# Patient Record
Sex: FEMALE | Race: WHITE | Employment: UNEMPLOYED
[De-identification: names, ages, dates, MRNs, and addresses within clinical notes are randomized per-mention and may not be internally consistent; named-entity substitution may affect disease eponyms.]

---

## 2017-07-08 ENCOUNTER — HEALTH MAINTENANCE LETTER (OUTPATIENT)
Age: 52
End: 2017-07-08

## 2018-07-15 ENCOUNTER — HEALTH MAINTENANCE LETTER (OUTPATIENT)
Age: 53
End: 2018-07-15

## 2019-10-05 ENCOUNTER — HEALTH MAINTENANCE LETTER (OUTPATIENT)
Age: 54
End: 2019-10-05

## 2019-12-23 ENCOUNTER — HOSPITAL ENCOUNTER (EMERGENCY)
Facility: CLINIC | Age: 54
Discharge: PSYCHIATRIC OR SUBSTANCE USE FACILITY | End: 2019-12-24
Attending: EMERGENCY MEDICINE | Admitting: EMERGENCY MEDICINE
Payer: COMMERCIAL

## 2019-12-23 DIAGNOSIS — R45.89 AT RISK FOR SUICIDE: ICD-10-CM

## 2019-12-23 PROCEDURE — 90791 PSYCH DIAGNOSTIC EVALUATION: CPT

## 2019-12-23 PROCEDURE — 99285 EMERGENCY DEPT VISIT HI MDM: CPT | Mod: 25

## 2019-12-23 PROCEDURE — 25000132 ZZH RX MED GY IP 250 OP 250 PS 637: Mod: GY | Performed by: EMERGENCY MEDICINE

## 2019-12-23 RX ORDER — ACETAMINOPHEN 500 MG
1000 TABLET ORAL ONCE
Status: COMPLETED | OUTPATIENT
Start: 2019-12-23 | End: 2019-12-23

## 2019-12-23 RX ORDER — ACETAMINOPHEN 500 MG
500 TABLET ORAL ONCE
Status: COMPLETED | OUTPATIENT
Start: 2019-12-23 | End: 2019-12-24

## 2019-12-23 RX ORDER — OLANZAPINE 10 MG/1
10 TABLET, ORALLY DISINTEGRATING ORAL
Status: DISCONTINUED | OUTPATIENT
Start: 2019-12-23 | End: 2019-12-24 | Stop reason: HOSPADM

## 2019-12-23 RX ADMIN — ACETAMINOPHEN 500 MG: 500 TABLET, FILM COATED ORAL at 22:55

## 2019-12-23 ASSESSMENT — MIFFLIN-ST. JEOR: SCORE: 2620.72

## 2019-12-23 NOTE — ED TRIAGE NOTES
"\"I'm having suicidal thoughts and I am not able to care for myself.\" Pt came from [facility]. Pt has been released.   "

## 2019-12-24 ENCOUNTER — HOSPITAL ENCOUNTER (INPATIENT)
Facility: CLINIC | Age: 54
LOS: 4 days | Discharge: HOME | DRG: 885 | End: 2019-12-28
Attending: PSYCHIATRY & NEUROLOGY | Admitting: PSYCHIATRY & NEUROLOGY
Payer: COMMERCIAL

## 2019-12-24 VITALS
HEART RATE: 105 BPM | SYSTOLIC BLOOD PRESSURE: 166 MMHG | TEMPERATURE: 97.5 F | DIASTOLIC BLOOD PRESSURE: 90 MMHG | HEIGHT: 70 IN | WEIGHT: 293 LBS | OXYGEN SATURATION: 96 % | BODY MASS INDEX: 41.95 KG/M2 | RESPIRATION RATE: 16 BRPM

## 2019-12-24 PROBLEM — R45.851 SUICIDAL IDEATIONS: Status: ACTIVE | Noted: 2019-12-24

## 2019-12-24 PROCEDURE — 25000132 ZZH RX MED GY IP 250 OP 250 PS 637: Mod: GY | Performed by: EMERGENCY MEDICINE

## 2019-12-24 PROCEDURE — 25000132 ZZH RX MED GY IP 250 OP 250 PS 637: Performed by: CLINICAL NURSE SPECIALIST

## 2019-12-24 PROCEDURE — 12400001 ZZH R&B MH UMMC

## 2019-12-24 RX ORDER — HYDROXYZINE HYDROCHLORIDE 25 MG/1
25 TABLET, FILM COATED ORAL EVERY 4 HOURS PRN
Status: DISCONTINUED | OUTPATIENT
Start: 2019-12-24 | End: 2019-12-28 | Stop reason: HOSPADM

## 2019-12-24 RX ORDER — LEVOTHYROXINE SODIUM 125 UG/1
125 CAPSULE ORAL
COMMUNITY

## 2019-12-24 RX ORDER — RIVAROXABAN 20 MG/1
TABLET, FILM COATED ORAL
COMMUNITY
Start: 2019-12-11 | End: 2019-12-24

## 2019-12-24 RX ORDER — PREGABALIN 100 MG/1
100 CAPSULE ORAL 2 TIMES DAILY
COMMUNITY

## 2019-12-24 RX ORDER — NALOXONE HYDROCHLORIDE 0.4 MG/ML
.1-.4 INJECTION, SOLUTION INTRAMUSCULAR; INTRAVENOUS; SUBCUTANEOUS
Status: DISCONTINUED | OUTPATIENT
Start: 2019-12-24 | End: 2019-12-28 | Stop reason: HOSPADM

## 2019-12-24 RX ORDER — TOPIRAMATE SPINKLE 25 MG/1
50 CAPSULE ORAL AT BEDTIME
Status: DISCONTINUED | OUTPATIENT
Start: 2019-12-24 | End: 2019-12-25

## 2019-12-24 RX ORDER — ALBUTEROL SULFATE 90 UG/1
1-2 AEROSOL, METERED RESPIRATORY (INHALATION) EVERY 6 HOURS PRN
Status: DISCONTINUED | OUTPATIENT
Start: 2019-12-24 | End: 2019-12-28 | Stop reason: HOSPADM

## 2019-12-24 RX ORDER — ALBUTEROL SULFATE 90 UG/1
1-2 AEROSOL, METERED RESPIRATORY (INHALATION) EVERY 6 HOURS PRN
COMMUNITY

## 2019-12-24 RX ORDER — MONTELUKAST SODIUM 10 MG/1
10 TABLET ORAL AT BEDTIME
COMMUNITY

## 2019-12-24 RX ORDER — LEVOTHYROXINE SODIUM 150 UG/1
150 TABLET ORAL
COMMUNITY
End: 2019-12-24

## 2019-12-24 RX ORDER — MOMETASONE FUROATE MONOHYDRATE 50 UG/1
2 SPRAY, METERED NASAL DAILY
COMMUNITY

## 2019-12-24 RX ORDER — MONTELUKAST SODIUM 10 MG/1
10 TABLET ORAL AT BEDTIME
Status: DISCONTINUED | OUTPATIENT
Start: 2019-12-24 | End: 2019-12-28 | Stop reason: HOSPADM

## 2019-12-24 RX ORDER — KETOCONAZOLE 20 MG/ML
SHAMPOO TOPICAL DAILY
Status: DISCONTINUED | OUTPATIENT
Start: 2019-12-24 | End: 2019-12-28 | Stop reason: HOSPADM

## 2019-12-24 RX ORDER — BUPROPION HYDROCHLORIDE 100 MG/1
350 TABLET ORAL EVERY MORNING
COMMUNITY

## 2019-12-24 RX ORDER — MULTIPLE VITAMINS W/ MINERALS TAB 9MG-400MCG
1 TAB ORAL 2 TIMES DAILY
Status: DISCONTINUED | OUTPATIENT
Start: 2019-12-24 | End: 2019-12-28 | Stop reason: HOSPADM

## 2019-12-24 RX ORDER — TOPIRAMATE SPINKLE 25 MG/1
CAPSULE ORAL
COMMUNITY
Start: 2019-12-16 | End: 2019-12-24

## 2019-12-24 RX ORDER — LEVOTHYROXINE SODIUM 13 UG/1
150 CAPSULE ORAL
COMMUNITY

## 2019-12-24 RX ORDER — LEVOTHYROXINE SODIUM 13 UG/1
150 CAPSULE ORAL
Status: DISCONTINUED | OUTPATIENT
Start: 2019-12-25 | End: 2019-12-28 | Stop reason: HOSPADM

## 2019-12-24 RX ORDER — PENICILLIN V POTASSIUM 500 MG/1
500 TABLET, FILM COATED ORAL 2 TIMES DAILY
COMMUNITY

## 2019-12-24 RX ORDER — PENICILLIN V POTASSIUM 500 MG/1
500 TABLET, FILM COATED ORAL 2 TIMES DAILY
Status: DISCONTINUED | OUTPATIENT
Start: 2019-12-24 | End: 2019-12-28 | Stop reason: HOSPADM

## 2019-12-24 RX ORDER — HYDROCODONE BITARTRATE AND ACETAMINOPHEN 5; 325 MG/1; MG/1
1-2 TABLET ORAL EVERY 4 HOURS PRN
Status: DISCONTINUED | OUTPATIENT
Start: 2019-12-24 | End: 2019-12-28 | Stop reason: HOSPADM

## 2019-12-24 RX ORDER — LEVOTHYROXINE SODIUM 125 UG/1
125 TABLET ORAL
COMMUNITY
End: 2019-12-24

## 2019-12-24 RX ORDER — HYDROCODONE BITARTRATE AND ACETAMINOPHEN 5; 325 MG/1; MG/1
2 TABLET ORAL ONCE
Status: COMPLETED | OUTPATIENT
Start: 2019-12-24 | End: 2019-12-24

## 2019-12-24 RX ORDER — PREGABALIN 75 MG/1
CAPSULE ORAL
COMMUNITY
Start: 2019-12-16 | End: 2019-12-24

## 2019-12-24 RX ORDER — MULTIPLE VITAMINS W/ MINERALS TAB 9MG-400MCG
1 TAB ORAL 2 TIMES DAILY
COMMUNITY

## 2019-12-24 RX ORDER — PREGABALIN 50 MG/1
100 CAPSULE ORAL 2 TIMES DAILY
Status: DISCONTINUED | OUTPATIENT
Start: 2019-12-24 | End: 2019-12-25

## 2019-12-24 RX ORDER — LEVOTHYROXINE SODIUM 125 UG/1
125 CAPSULE ORAL
Status: DISCONTINUED | OUTPATIENT
Start: 2019-12-25 | End: 2019-12-28 | Stop reason: HOSPADM

## 2019-12-24 RX ORDER — PREGABALIN 100 MG/1
CAPSULE ORAL
COMMUNITY
Start: 2019-12-20 | End: 2019-12-24

## 2019-12-24 RX ORDER — MOMETASONE FUROATE MONOHYDRATE 50 UG/1
2 SPRAY, METERED NASAL DAILY
Status: DISCONTINUED | OUTPATIENT
Start: 2019-12-24 | End: 2019-12-28 | Stop reason: HOSPADM

## 2019-12-24 RX ORDER — TOPIRAMATE SPINKLE 25 MG/1
50 CAPSULE ORAL AT BEDTIME
COMMUNITY

## 2019-12-24 RX ORDER — PREGABALIN 75 MG/1
75 CAPSULE ORAL 2 TIMES DAILY
COMMUNITY

## 2019-12-24 RX ADMIN — PENICILLIN V POTASIUM 500 MG: 500 TABLET OROPHARYNGEAL at 20:14

## 2019-12-24 RX ADMIN — ACETAMINOPHEN 500 MG: 500 TABLET, FILM COATED ORAL at 00:15

## 2019-12-24 RX ADMIN — MONTELUKAST 10 MG: 10 TABLET, FILM COATED ORAL at 20:14

## 2019-12-24 RX ADMIN — HYDROCODONE BITARTRATE AND ACETAMINOPHEN 2 TABLET: 5; 325 TABLET ORAL at 08:19

## 2019-12-24 RX ADMIN — PREGABALIN 100 MG: 50 CAPSULE ORAL at 20:13

## 2019-12-24 RX ADMIN — MOMETASONE 2 SPRAY: 50 SPRAY, METERED NASAL at 20:15

## 2019-12-24 RX ADMIN — MULTIPLE VITAMINS W/ MINERALS TAB 1 TABLET: TAB at 20:18

## 2019-12-24 RX ADMIN — TOPIRAMATE 50 MG: 25 CAPSULE, COATED PELLETS ORAL at 20:15

## 2019-12-24 RX ADMIN — RIVAROXABAN 20 MG: 10 TABLET, FILM COATED ORAL at 20:14

## 2019-12-24 ASSESSMENT — ACTIVITIES OF DAILY LIVING (ADL)
HYGIENE/GROOMING: WITH ASSISTANCE
DRESS: WITH ASSISTANCE
LAUNDRY: UNABLE TO COMPLETE
ORAL_HYGIENE: INDEPENDENT

## 2019-12-24 NOTE — PHARMACY-ADMISSION MEDICATION HISTORY
Pharmacy Medication History  Admission medication history interview status for the 12/23/2019  admission is complete. See EPIC admission navigator for prior to admission medications     Medication history sources: Spoke w/ patient.  Called [facility] pharmacy in [place].    Medication history source reliability: Moderate  Adherence assessment: Moderate - patient has not taken her medications since Sunday.    Medication reconciliation completed by provider prior to medication history? No    Time spent in this activity: 30 minutes      Prior to Admission medications    Medication Sig Last Dose Taking? Auth Provider   albuterol (PROAIR HFA/PROVENTIL HFA/VENTOLIN HFA) 108 (90 Base) MCG/ACT inhaler Inhale 1-2 puffs into the lungs every 6 hours as needed for shortness of breath / dyspnea or wheezing  at prn Yes Unknown, Entered By History   buPROPion (WELLBUTRIN) 100 MG tablet Take 350 mg by mouth every morning  Yes Unknown, Entered By History   diclofenac (VOLTAREN) 1 % topical gel Place 4 g onto the skin 2 times daily as needed (Knee pain)   at prn Yes Unknown, Entered By History   fluticasone-salmeterol (ADVAIR) 500-50 MCG/DOSE inhaler Inhale 1 puff into the lungs every 12 hours  Yes Unknown, Entered By History   HYDROcodone-acetaminophen (NORCO) 5-325 MG per tablet Take 1-2 tablets by mouth every 4 hours as needed for other (Moderate to Severe Pain)  at prn Yes Carly Schwartz PA-C   ketoconazole (NIZORAL) 2 % shampoo Apply  topically. topically once daily prn  at prn Yes Mago Acosta MD   levothyroxine (TIROSINT) 125 MCG capsule Take 125 mcg by mouth every morning (before breakfast) Takes with 150 mcg capsule  Total dose = 275 mcg daily  Yes Unknown, Entered By History   levothyroxine (TIROSINT) 150 MCG capsule Take 150 mcg by mouth every morning (before breakfast) Takes with 125 mcg capsules  Total dose = 275 mcg daily  Yes Unknown, Entered By History   mometasone (NASONEX) 50 MCG/ACT nasal spray  Spray 2 sprays into both nostrils daily  Yes Unknown, Entered By History   montelukast (SINGULAIR) 10 MG tablet Take 10 mg by mouth At Bedtime  Yes Unknown, Entered By History   multivitamin w/minerals (THERA-VIT-M) tablet Take 1 tablet by mouth 2 times daily  Yes Unknown, Entered By History   penicillin V (VEETID) 500 MG tablet Take 500 mg by mouth 2 times daily  Yes Unknown, Entered By History   pregabalin (LYRICA) 100 MG capsule Take 100 mg by mouth 2 times daily Takes with 75 mg capsules  Yes Unknown, Entered By History   pregabalin (LYRICA) 75 MG capsule Take 75 mg by mouth 2 times daily Takes with 100 mg capsules  Yes Unknown, Entered By History   rivaroxaban ANTICOAGULANT (XARELTO) 20 MG TABS tablet Take 20 mg by mouth daily 12/22/2019 at am Yes Unknown, Entered By History   tiZANidine (ZANAFLEX) 4 MG tablet Take 4 mg by mouth 3 times daily as needed for muscle spasms  Yes Unknown, Entered By History   topiramate (TOPAMAX) 25 MG capsule Take 50 mg by mouth At Bedtime   Yes Unknown, Entered By History   ACE/ARB NOT PRESCRIBED, INTENTIONAL, by Other route continuous prn.   Tamie Thompson MD   ASPIRIN NOT PRESCRIBED, INTENTIONAL, by Other route continuous prn.   Tamie Thompson MD Carolyn Dahlman, PharmD, BCPS

## 2019-12-24 NOTE — ED NOTES
Pt hit call light and is requesting food; pt was informed that we do not have meals in the dept now; will bring patient once we get them. Pt is again requesting meds; pt ED care team was made aware. Pt also requested her beef jerky from her bag; pt was assisted in finding it. All other belongings were returned to the pt's locker in ED room 16.

## 2019-12-24 NOTE — ED NOTES
Pt was wanded and patted down by ED staff due to hospital not having large enough sized scrubs to fit pt
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
## 2019-12-24 NOTE — ED NOTES
Pt. Ambulated to door and back to cart in room. C/O knees and legs hurting and in tears. States also has crutches and walker to use for ambulation.

## 2019-12-24 NOTE — ED NOTES
Assumed care of patient: patient resting on bed using her own CPAP machine- breaths even and unlabored, equal chest rise and fall

## 2019-12-24 NOTE — ED NOTES
patient c/o 10/10 back pain. States that the muscle relaxer usually works but its not working now. Patient crying, moaning in pain. Up to bedside commode independently. Pleasant and cooperative. Breakfast ordered. Pt repositions self in bed. Awaiting bed placement

## 2019-12-24 NOTE — ED NOTES
Pt presents with increased depression and suicidal thoughts. Pt states she has hx of suicide attempt in the 80s and once in 1998. Pt states she has been having thoughts of overdosing on her muscle relaxants but does not want to because she does not want her MD's name to be on the pills she takes. Pt was in [facility] last night due to an argument with her wife and was released today and came here. Pt states she has been having really bad depression since the fall but has avoided hospitals because she is unsure if inpatient psych will be able to accommodate her health and physical needs. Pt is 430 pounds and states she needs help with ambulating and sleeps in a recliner and uses a cane/cpap. Pt cooperative at this time. Pt very tearful

## 2019-12-24 NOTE — ED NOTES
I spoke with intake who says this pt will have to spend the night due to maintenance unavailable until morning to switch out the behavioral bed to a bariatric bed.

## 2019-12-24 NOTE — ED NOTES
EDT brought Pt her CPAP and assisted with setting it up for Pt.  Pt was able to place the mask on herself.

## 2019-12-24 NOTE — ED NOTES
Patient awake and reporting severe back spasms. Per ER MD patient is able to take 1 of her tizanidine 4mg tabs. (pt has Rx for 1 tab TID prn)  Patient given 1 tab by this writer from her home medications- medications locked back up in locker after administering tablet. Patient repositioned in bed and gave her a cool wash cloth and ice packs for her to cool down. Will continue to monitor patient.

## 2019-12-24 NOTE — ED PROVIDER NOTES
Assumed care at change of shift.  Ultimately admission arranged at [facility].     Jenaro Whalen MD  12/24/19 4551

## 2019-12-25 LAB
ALBUMIN SERPL-MCNC: 3 G/DL (ref 3.4–5)
ALP SERPL-CCNC: 124 U/L (ref 40–150)
ALT SERPL W P-5'-P-CCNC: 60 U/L (ref 0–50)
ANION GAP SERPL CALCULATED.3IONS-SCNC: 7 MMOL/L (ref 3–14)
AST SERPL W P-5'-P-CCNC: 18 U/L (ref 0–45)
BASOPHILS # BLD AUTO: 0 10E9/L (ref 0–0.2)
BASOPHILS NFR BLD AUTO: 0.2 %
BILIRUB SERPL-MCNC: 0.8 MG/DL (ref 0.2–1.3)
BUN SERPL-MCNC: 21 MG/DL (ref 7–30)
CALCIUM SERPL-MCNC: 8.4 MG/DL (ref 8.5–10.1)
CHLORIDE SERPL-SCNC: 106 MMOL/L (ref 94–109)
CHOLEST SERPL-MCNC: 189 MG/DL
CO2 SERPL-SCNC: 26 MMOL/L (ref 20–32)
CREAT SERPL-MCNC: 1.17 MG/DL (ref 0.52–1.04)
DIFFERENTIAL METHOD BLD: ABNORMAL
EOSINOPHIL # BLD AUTO: 0.1 10E9/L (ref 0–0.7)
EOSINOPHIL NFR BLD AUTO: 1.4 %
ERYTHROCYTE [DISTWIDTH] IN BLOOD BY AUTOMATED COUNT: 16.2 % (ref 10–15)
GFR SERPL CREATININE-BSD FRML MDRD: 52 ML/MIN/{1.73_M2}
GLUCOSE SERPL-MCNC: 101 MG/DL (ref 70–99)
HCT VFR BLD AUTO: 42.4 % (ref 35–47)
HDLC SERPL-MCNC: 78 MG/DL
HGB BLD-MCNC: 13.2 G/DL (ref 11.7–15.7)
IMM GRANULOCYTES # BLD: 0 10E9/L (ref 0–0.4)
IMM GRANULOCYTES NFR BLD: 0.4 %
LDLC SERPL CALC-MCNC: 97 MG/DL
LYMPHOCYTES # BLD AUTO: 1.9 10E9/L (ref 0.8–5.3)
LYMPHOCYTES NFR BLD AUTO: 20.3 %
MCH RBC QN AUTO: 28 PG (ref 26.5–33)
MCHC RBC AUTO-ENTMCNC: 31.1 G/DL (ref 31.5–36.5)
MCV RBC AUTO: 90 FL (ref 78–100)
MONOCYTES # BLD AUTO: 0.8 10E9/L (ref 0–1.3)
MONOCYTES NFR BLD AUTO: 8 %
NEUTROPHILS # BLD AUTO: 6.6 10E9/L (ref 1.6–8.3)
NEUTROPHILS NFR BLD AUTO: 69.7 %
NONHDLC SERPL-MCNC: 111 MG/DL
NRBC # BLD AUTO: 0 10*3/UL
NRBC BLD AUTO-RTO: 0 /100
PLATELET # BLD AUTO: 238 10E9/L (ref 150–450)
POTASSIUM SERPL-SCNC: 3.7 MMOL/L (ref 3.4–5.3)
PROT SERPL-MCNC: 6.5 G/DL (ref 6.8–8.8)
RBC # BLD AUTO: 4.71 10E12/L (ref 3.8–5.2)
SODIUM SERPL-SCNC: 139 MMOL/L (ref 133–144)
T4 FREE SERPL-MCNC: 1.11 NG/DL (ref 0.76–1.46)
TRIGL SERPL-MCNC: 71 MG/DL
TSH SERPL DL<=0.005 MIU/L-ACNC: 5.78 MU/L (ref 0.4–4)
WBC # BLD AUTO: 9.5 10E9/L (ref 4–11)

## 2019-12-25 PROCEDURE — 99207 ZZC CONSULT E&M CHANGED TO INITIAL LEVEL: CPT | Performed by: PHYSICIAN ASSISTANT

## 2019-12-25 PROCEDURE — 84443 ASSAY THYROID STIM HORMONE: CPT | Performed by: CLINICAL NURSE SPECIALIST

## 2019-12-25 PROCEDURE — 36415 COLL VENOUS BLD VENIPUNCTURE: CPT | Performed by: CLINICAL NURSE SPECIALIST

## 2019-12-25 PROCEDURE — 12400001 ZZH R&B MH UMMC

## 2019-12-25 PROCEDURE — 25000132 ZZH RX MED GY IP 250 OP 250 PS 637: Performed by: PSYCHIATRY & NEUROLOGY

## 2019-12-25 PROCEDURE — 25000132 ZZH RX MED GY IP 250 OP 250 PS 637: Performed by: PHYSICIAN ASSISTANT

## 2019-12-25 PROCEDURE — 25000132 ZZH RX MED GY IP 250 OP 250 PS 637: Performed by: CLINICAL NURSE SPECIALIST

## 2019-12-25 PROCEDURE — 99222 1ST HOSP IP/OBS MODERATE 55: CPT | Performed by: PHYSICIAN ASSISTANT

## 2019-12-25 PROCEDURE — 85025 COMPLETE CBC W/AUTO DIFF WBC: CPT | Performed by: CLINICAL NURSE SPECIALIST

## 2019-12-25 PROCEDURE — 80053 COMPREHEN METABOLIC PANEL: CPT | Performed by: CLINICAL NURSE SPECIALIST

## 2019-12-25 PROCEDURE — 99223 1ST HOSP IP/OBS HIGH 75: CPT | Mod: AI | Performed by: PSYCHIATRY & NEUROLOGY

## 2019-12-25 PROCEDURE — 80061 LIPID PANEL: CPT | Performed by: CLINICAL NURSE SPECIALIST

## 2019-12-25 PROCEDURE — 84439 ASSAY OF FREE THYROXINE: CPT | Performed by: CLINICAL NURSE SPECIALIST

## 2019-12-25 PROCEDURE — 80307 DRUG TEST PRSMV CHEM ANLYZR: CPT | Performed by: CLINICAL NURSE SPECIALIST

## 2019-12-25 RX ORDER — POLYETHYLENE GLYCOL 3350 17 G/17G
17 POWDER, FOR SOLUTION ORAL DAILY
Status: DISCONTINUED | OUTPATIENT
Start: 2019-12-25 | End: 2019-12-28 | Stop reason: HOSPADM

## 2019-12-25 RX ORDER — LANOLIN ALCOHOL/MO/W.PET/CERES
3 CREAM (GRAM) TOPICAL AT BEDTIME
Status: DISCONTINUED | OUTPATIENT
Start: 2019-12-25 | End: 2019-12-28 | Stop reason: HOSPADM

## 2019-12-25 RX ORDER — POLYETHYLENE GLYCOL 3350 17 G/17G
17 POWDER, FOR SOLUTION ORAL DAILY PRN
Status: DISCONTINUED | OUTPATIENT
Start: 2019-12-25 | End: 2019-12-28 | Stop reason: HOSPADM

## 2019-12-25 RX ORDER — MICONAZOLE NITRATE 20 MG/G
CREAM TOPICAL 2 TIMES DAILY PRN
Status: DISCONTINUED | OUTPATIENT
Start: 2019-12-25 | End: 2019-12-28 | Stop reason: HOSPADM

## 2019-12-25 RX ORDER — TOPIRAMATE SPINKLE 25 MG/1
25 CAPSULE ORAL AT BEDTIME
Status: DISCONTINUED | OUTPATIENT
Start: 2019-12-25 | End: 2019-12-28 | Stop reason: HOSPADM

## 2019-12-25 RX ADMIN — RIVAROXABAN 20 MG: 10 TABLET, FILM COATED ORAL at 09:10

## 2019-12-25 RX ADMIN — PREGABALIN 175 MG: 100 CAPSULE ORAL at 22:43

## 2019-12-25 RX ADMIN — POLYETHYLENE GLYCOL 3350 17 G: 17 POWDER, FOR SOLUTION ORAL at 19:14

## 2019-12-25 RX ADMIN — MONTELUKAST 10 MG: 10 TABLET, FILM COATED ORAL at 22:44

## 2019-12-25 RX ADMIN — HYDROCODONE BITARTRATE AND ACETAMINOPHEN 2 TABLET: 5; 325 TABLET ORAL at 09:21

## 2019-12-25 RX ADMIN — BUPROPION HYDROCHLORIDE 350 MG: 75 TABLET, FILM COATED ORAL at 09:12

## 2019-12-25 RX ADMIN — PREGABALIN 100 MG: 50 CAPSULE ORAL at 09:10

## 2019-12-25 RX ADMIN — TIZANIDINE 4 MG: 4 TABLET ORAL at 19:14

## 2019-12-25 RX ADMIN — POLYETHYLENE GLYCOL 3350 17 G: 17 POWDER, FOR SOLUTION ORAL at 12:56

## 2019-12-25 RX ADMIN — MOMETASONE 2 SPRAY: 50 SPRAY, METERED NASAL at 09:13

## 2019-12-25 RX ADMIN — MULTIPLE VITAMINS W/ MINERALS TAB 1 TABLET: TAB at 22:44

## 2019-12-25 RX ADMIN — FLUTICASONE FUROATE AND VILANTEROL TRIFENATATE 1 PUFF: 200; 25 POWDER RESPIRATORY (INHALATION) at 12:57

## 2019-12-25 RX ADMIN — PENICILLIN V POTASIUM 500 MG: 500 TABLET OROPHARYNGEAL at 09:10

## 2019-12-25 RX ADMIN — TIZANIDINE 4 MG: 4 TABLET ORAL at 02:13

## 2019-12-25 RX ADMIN — MULTIPLE VITAMINS W/ MINERALS TAB 1 TABLET: TAB at 09:10

## 2019-12-25 RX ADMIN — TOPIRAMATE 25 MG: 25 CAPSULE, COATED PELLETS ORAL at 22:43

## 2019-12-25 RX ADMIN — PENICILLIN V POTASIUM 500 MG: 500 TABLET OROPHARYNGEAL at 22:43

## 2019-12-25 ASSESSMENT — ACTIVITIES OF DAILY LIVING (ADL)
TOILETING: 0-->INDEPENDENT
AMBULATION: 1-->ASSISTIVE EQUIPMENT
TRANSFERRING: 1-->ASSISTIVE EQUIPMENT
LAUNDRY: UNABLE TO COMPLETE
BATHING: 0-->INDEPENDENT
ORAL_HYGIENE: INDEPENDENT
RETIRED_COMMUNICATION: 0-->UNDERSTANDS/COMMUNICATES WITHOUT DIFFICULTY
FALL_HISTORY_WITHIN_LAST_SIX_MONTHS: NO
DRESS: 0-->INDEPENDENT
ORAL_HYGIENE: INDEPENDENT
COGNITION: 0 - NO COGNITION ISSUES REPORTED
DRESS: STREET CLOTHES;INDEPENDENT
HYGIENE/GROOMING: INDEPENDENT
SWALLOWING: 0-->SWALLOWS FOODS/LIQUIDS WITHOUT DIFFICULTY
HYGIENE/GROOMING: INDEPENDENT
RETIRED_EATING: 0-->INDEPENDENT
DRESS: INDEPENDENT

## 2019-12-25 NOTE — PHARMACY-ADMISSION MEDICATION HISTORY
Medication history performed at [facility] on 12/24/19. Based on my assessment of the patient's fill history this medication history is accurate. Please see note from 12/24/19 for further medication history information.    Lali Quan, Pharm.D.

## 2019-12-25 NOTE — PROGRESS NOTES
12/24/19 1910   Patient Belongings   Did you bring any home meds/supplements to the hospital?  Yes   Disposition of meds  Sent to security/pharmacy per site process   Patient Belongings remains with patient;locker;sent to security per site process   Patient Belongings Remaining with Patient clothing   Patient Belongings Sent Home none   Patient Belongings Put in Hospital Secure Location (Security or Locker, etc.) cash/credit card;cell phone/electronics;crutches;clothing;plastic bag;shoes;wallet;medication(s);tote   Belongings Search Yes   Clothing Search Yes   Second Staff Siri KILGORE     In Locker:  - pair of crutches with cushions  - 1 cell phone with case  - 2 backpacks  -1 CPAP with accessories  -1 jacket (with strings)  -1 pair shoes  -1 bra  -1 hat  -1 tshirt  -1 nail clipper  -coin purse  -1 pair gloves  - 2 food spices  - 1 unopened [employer] package  -portable phone   -1 wallet  -1 ID & various business cards    On person:  -T shirt  -sweatpants  -socks    Sent to Security:  - $30 cash  - 1 Check ($28)  - 1 A4 Data Debit Card (*2183)  - 1 [employer] (*1114)  - Various Pill Bottles and Pills  - Various Medication Bottles and pills    A               Admission:  I am responsible for any personal items that are not sent to the safe or pharmacy.  [facility] is not responsible for loss, theft or damage of any property in my possession.    Signature:  _________________________________ Date: _______  Time: _____                                              Staff Signature:  ____________________________ Date: ________  Time: _____      2nd Staff person, if patient is unable/unwilling to sign:    Signature: ________________________________ Date: ________  Time: _____     Discharge:  [facility] has returned all of my personal belongings:    Signature: _________________________________ Date: ________  Time: _____                                          Staff Signature:  ____________________________ Date: ________   Time: _____
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
## 2019-12-25 NOTE — PROGRESS NOTES
"   12/25/19 6884   Behavioral Health   Hallucinations denies / not responding to hallucinations   Thinking intact;poor concentration;other (see comment)  (Pt \"phased\" out a little bit while talking with her.)   Orientation person: oriented;place: oriented;date: oriented;time: oriented   Memory baseline memory   Insight other (see comment)  (ERIN)   Judgement intact   Eye Contact at examiner   Affect blunted, flat   Mood mood is calm   Physical Appearance/Attire appears stated age;attire appropriate to age and situation   Hygiene well groomed   Suicidality other (see comments)  (Pt denies.)   Wish to be Dead Description (Recent) no   Non-Specific Active Suicidal Thought Description (Recent) no   Self Injury other (see comment)  (Pt denies.)   Elopement   (Pt didn't exhibit these behaviors this shift.)   Activity other (see comment)  (in milieu but quiet and kept to self)   Speech coherent;clear   Psychomotor / Gait slow  (needs walker and/or wheelchair assistance)   Coping/Psychosocial   Verbalized Emotional State anxiety;depression   Activities of Daily Living   Hygiene/Grooming independent   Oral Hygiene independent   Dress independent   Room Organization independent   Significant Event   Significant Event Other (see comments)  (Shift Summary)   Pt denies SI and SIB thoughts. Pt rates depression as a 5 and anxiety as a 2. Pt states that her back pain was so high that it woke her up last night, and as a result, she didn't sleep super well last night. She did however get some more sleep this morning. Pt's goal are to read more of her book, which is a coping skill and get cleaned up/showered. Pt also requested to use an electric shaver. Pt's coping skills include reading, chewing on a straw and on gum. Pt was calm, cooperative and pleasant.  "
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
## 2019-12-25 NOTE — CONSULTS
[facility]  Internal Medicine Consult     Esther Ruiz MRN# [id number]   Age: 54 year old YOB: 1965     Date of Admission: 12/24/2019  Date of Consult: 12/25/2019    Primary Care Provider: Melanie Scherer    Requesting Service: Dr. Maldonado  Reason for Consult: General Medical Evaluation      SUBJECTIVE   CC:   H/o DVT   Assessment and Plan/Recommendations:   Esther Ruiz is a 54 year old female with history of hypothyroidism, recurrent BLE infections, DVT, EDILIA, chronic pain, GERD, HTN, depression, CKD III, and anxiety who was admitted to station 10 with psychiatric decompensation     Depression and anxiety with SI: 2/2 recently being in skilled nursing  - Managemetn per psych     CKD III: BL Cr 01-1.2. No PTA ACE-I intentional per charting. Cr is stable. Avoid nephrotoxic meds     HTN: No PTA meds. /83 on last check 12/24. Likely 2/2 psychiatric decompensation   - Repeat BP now   - Hydralazine prn for SBP>170 or DBP>110  ** Addendum: BP improved to 147/85 without intervention. Contact medicine if BP spikes >170/>110 or is consistently >150s/>90s    Chronic, recurrent BLE infections: Issues with BLE infections for several years. Was last admitted to the hospital 7/2019 for infections. Follows OP with ID through [facility] which unfortunately does not have Epic, so I am unable to access records. Reports she was started on long term (6-12 months) PCN to prevent further infections 11/2019. No e/o active skin infection on exam. Afebrile  - Continue PTA PCN  - Follow up with OP ID as indicated  - Contact medicine with changes or concerns     H/o DVT: Per patient, etiology unclear but she was diagnosed with several clots in the same extremity several year ago. No dyspnea, chest pain, palpitations or change in BLE swelling. Continue PTA Rivaroxaban     Chronic pain: Continue PTA lyrica. Will defer Norco and Tizanidine use to primary team     GERD: No PTA meds, monitor      EDILIA: Continue CPAP    Transaminitis: Mild. ALT 60, other LFTs normal.  - Trend with PCP on discharge     Hypothyroidism: TSH 5.78 but T4 1.11. Continue PTA synthroid     Asthma: Stable. Continue PTA albuterol, SIngulair, and Advair       Currently, medically stable and internal medicine will sign off. Please contact if future questions or concerns arise. Thank you for the opportunity to be a part of this patient's care.      Isamar Crowe PA-C  Internal Medicine [facility] Hospitalist  [phone]  December 25, 2019         HPI:   Esther Ruiz is a 54 year old female with history of hypothyroidism, recurrent BLE infections, DVT, EDILIA, chronic pain, GERD, HTN, depression, CKD III, and anxiety who was admitted to station 10 with psychiatric decompensation     Patient reports feeling quite tired. She has chronic knee and back pain which has flared recently as she missed appointments with her OP chiropractor. She denies new weakness or numbness. She has chronic and recurrent infections in the legs for which she follows OP with ID via [facility]. She denies fever or chills. No active infection. Was started on chronic PCN by ID last month. She is compliant with use. She also reports h/o DVT 2-3 year ago. She does not think etiology of clot was determined. She reports several clots in one extremity were diagnosed at the same time. She denies dyspnea, chest pain, palpitations. Compliant with PTA anticoagulation. No fever or chills. Denies confusion        Past Medical History:     Past Medical History:   Diagnosis Date     Anxiety state, unspecified      Bariatric surgery status 8/1/2007     CARDIOVASCULAR SCREENING; LDL GOAL LESS THAN 160      CKD (chronic kidney disease) stage 3, GFR 30-59 ml/min (H)      Depression      Difficulty in walking(719.7)      Essential hypertension, benign 4/30/2007     Gastro-oesophageal reflux disease      Incisional hernia without mention of obstruction or gangrene       Knee pain, right      Morbid obesity (H)      Sleep apnea      Thrombosis of leg      Hx of leg clot  - not related to a surgery.     Unspecified hypothyroidism 4/30/2007        Reviewed and updated in [facility].     Past Surgical History:      Past Surgical History:   Procedure Laterality Date     ARTHROSCOPY KNEE Right 9/15/2015    Procedure: ARTHROSCOPY KNEE;  Surgeon: Kait Austin MD;  Location: [facility] GASTRIC BYPASS,OBESE<100CM JORGE-EN-Y  2001     C NONSPECIFIC PROCEDURE      approx 2005 sinus surgery      ENT SURGERY       ESOPHAGOSCOPY, GASTROSCOPY, DUODENOSCOPY (EGD), COMBINED  9/1/2011    Procedure:COMBINED ESOPHAGOSCOPY, GASTROSCOPY, DUODENOSCOPY (EGD), BIOPSY SINGLE OR MULTIPLE; Surgeon:ANIL PATRICIO; Location: GI     HC BREATH HYDROGEN TEST  9/2/2011    Procedure:HYDROGEN BREATH TEST; Surgeon:WILDA AYON; Location: GI     HERNIA REPAIR, INCISIONAL  2007     STRIP VEIN      bilateral         Social History:   Tobacco use: No  Alcohol use: No  Elicit drug use: No     Family History:     Family History   Problem Relation Age of Onset     Diabetes Paternal Aunt      Diabetes Paternal Uncle          Allergies:     Allergies   Allergen Reactions     Augmentin      Diarrhea      Azithromycin      It has lactose in and she has intolerance to lactose         Medications:   Reviewed. Please see MAR     Review of Systems:   10 point ROS of systems including Constitutional, Eyes, Respiratory, Cardiovascular, Gastroenterology, Genitourinary, Integumentary, Muscularskeletal, Psychiatric were all negative except for pertinent positives noted in my HPI.    OBJECTIVE   Physical Exam:   Vitals were reviewed  Blood pressure (!) 147/85, pulse 74, temperature 97.8  F (36.6  C), temperature source Tympanic, resp. rate 16, weight (!) 202.6 kg (446 lb 11.2 oz), last menstrual period 12/29/2009, SpO2 96 %.  General: Alert and oriented x3, pleasant. Malodorous   HEENT: Anicteric sclera, membranes  moist. CPAP in place  Cardiovascular: RRR, S1S2. No murmur noted  Lungs: CTAB without wheezing or crackles   GI: Abdomen soft, non-tender with bowel sounds present. No guarding or rebound present  Vascular: Non-pitting peripheral edema, distal pulses palpable  Neurologic: No focal deficits, CN II-XII grossly intact  Neuropsychiatric: Per psych  Skin: No jaundice, rashes, or lesions. No e/o active infection in the legs        Data:        Lab Results   Component Value Date     12/25/2019    Lab Results   Component Value Date    CHLORIDE 106 12/25/2019    Lab Results   Component Value Date    BUN PENDING 12/25/2019      Lab Results   Component Value Date    POTASSIUM 3.7 12/25/2019    Lab Results   Component Value Date    CO2 PENDING 12/25/2019    Lab Results   Component Value Date    CR PENDING 12/25/2019        Lab Results   Component Value Date    WBC 9.5 12/25/2019    HGB 13.2 12/25/2019    HCT 42.4 12/25/2019    MCV 90 12/25/2019     12/25/2019     Lab Results   Component Value Date    WBC 9.5 12/25/2019

## 2019-12-25 NOTE — PROGRESS NOTES
RT called to set-up hospital CPAP on patient. RT confirmed with caller that pt DID NOT have own CPAP. RT brought hospital CPAP and assortment of masks to fit to patient. Pt said she had her own machine and would like to use it. RT asked psych assoc. To get her CPAP. Nurse was asked where her CPAP was. Home cpap was brought out of room where personal belongings are. Associates plugged equipment in. RT confirmed pt did not require help. Nurse notified that pt was still wanting cream applied to skin sores under folds of skin on stomach. No further RT intervention or equipment needed. Pt comfortable using her home CPAP machine and prefers her own.

## 2019-12-25 NOTE — PROGRESS NOTES
Pt voided independently using her tongs. Pt missed the hat in the toilet so RN was unable to collect urine sample.    Please collect urine sample in evening shift. Hat is left in pt's bathroom. Thanks.

## 2019-12-25 NOTE — PROGRESS NOTES
1. What PRN did patient receive? Hydrocodone-acetaminophen (NORCO) 5-325mg - 2tablets    2. What was the patient doing that led to the PRN medication? Pt resting in medical bed; pt has knee pain and back secondary to obesity    3. Did they require R/S? no    4. Side effects to PRN medication? Relief of pain    5. After 1 Hour, patient appeared: pt reported relief of pain

## 2019-12-26 PROCEDURE — 12400001 ZZH R&B MH UMMC

## 2019-12-26 PROCEDURE — 25000132 ZZH RX MED GY IP 250 OP 250 PS 637: Performed by: CLINICAL NURSE SPECIALIST

## 2019-12-26 PROCEDURE — 25000132 ZZH RX MED GY IP 250 OP 250 PS 637: Mod: GY | Performed by: PSYCHIATRY & NEUROLOGY

## 2019-12-26 PROCEDURE — 25000132 ZZH RX MED GY IP 250 OP 250 PS 637: Mod: GY | Performed by: PHYSICIAN ASSISTANT

## 2019-12-26 PROCEDURE — 99231 SBSQ HOSP IP/OBS SF/LOW 25: CPT | Performed by: PSYCHIATRY & NEUROLOGY

## 2019-12-26 RX ADMIN — DICLOFENAC SODIUM 4 G: 10 GEL TOPICAL at 19:00

## 2019-12-26 RX ADMIN — MULTIPLE VITAMINS W/ MINERALS TAB 1 TABLET: TAB at 21:13

## 2019-12-26 RX ADMIN — RIVAROXABAN 20 MG: 10 TABLET, FILM COATED ORAL at 10:07

## 2019-12-26 RX ADMIN — MOMETASONE 2 SPRAY: 50 SPRAY, METERED NASAL at 10:05

## 2019-12-26 RX ADMIN — PENICILLIN V POTASIUM 500 MG: 500 TABLET OROPHARYNGEAL at 21:13

## 2019-12-26 RX ADMIN — PENICILLIN V POTASIUM 500 MG: 500 TABLET OROPHARYNGEAL at 10:07

## 2019-12-26 RX ADMIN — LEVOTHYROXINE SODIUM 125 MCG: 125 CAPSULE ORAL at 08:48

## 2019-12-26 RX ADMIN — MELATONIN TAB 3 MG 3 MG: 3 TAB at 00:56

## 2019-12-26 RX ADMIN — DICLOFENAC SODIUM 4 G: 10 GEL TOPICAL at 12:03

## 2019-12-26 RX ADMIN — POLYETHYLENE GLYCOL 3350 17 G: 17 POWDER, FOR SOLUTION ORAL at 10:06

## 2019-12-26 RX ADMIN — POLYETHYLENE GLYCOL 3350 17 G: 17 POWDER, FOR SOLUTION ORAL at 21:16

## 2019-12-26 RX ADMIN — LEVOTHYROXINE SODIUM 150 MCG: 13 CAPSULE ORAL at 08:48

## 2019-12-26 RX ADMIN — KETOCONAZOLE: 20 SHAMPOO, SUSPENSION TOPICAL at 10:05

## 2019-12-26 RX ADMIN — FLUTICASONE FUROATE AND VILANTEROL TRIFENATATE 1 PUFF: 200; 25 POWDER RESPIRATORY (INHALATION) at 10:05

## 2019-12-26 RX ADMIN — MONTELUKAST 10 MG: 10 TABLET, FILM COATED ORAL at 21:13

## 2019-12-26 RX ADMIN — PREGABALIN 175 MG: 100 CAPSULE ORAL at 10:06

## 2019-12-26 RX ADMIN — TOPIRAMATE 25 MG: 25 CAPSULE, COATED PELLETS ORAL at 21:13

## 2019-12-26 RX ADMIN — MICONAZOLE NITRATE: 20 CREAM TOPICAL at 23:15

## 2019-12-26 RX ADMIN — PREGABALIN 175 MG: 100 CAPSULE ORAL at 21:13

## 2019-12-26 RX ADMIN — TIZANIDINE 4 MG: 4 TABLET ORAL at 00:55

## 2019-12-26 RX ADMIN — MULTIPLE VITAMINS W/ MINERALS TAB 1 TABLET: TAB at 10:06

## 2019-12-26 RX ADMIN — BUPROPION HYDROCHLORIDE 350 MG: 75 TABLET, FILM COATED ORAL at 10:06

## 2019-12-26 ASSESSMENT — ACTIVITIES OF DAILY LIVING (ADL)
HYGIENE/GROOMING: INDEPENDENT;PROMPTS;WITH ASSISTANCE
DRESS: STREET CLOTHES
HYGIENE/GROOMING: PROMPTS;WITH ASSISTANCE
ORAL_HYGIENE: INDEPENDENT
DRESS: STREET CLOTHES
ORAL_HYGIENE: INDEPENDENT
LAUNDRY: WITH SUPERVISION

## 2019-12-26 NOTE — PLAN OF CARE
"Pt has spent the entire shift in her room, sleeping/napping on and off and also reading a book. Her affect has been mostly flat and blunted, she describes her mood as sad and depressed. She was noted to be emotionally labile and in tears, \"I just feel overwhelmed on where to start\". She endorses both depression and anxiety, rating them at 5 and 3 out of 10 respectively. She denies SI, or wish to be dead. She also denies SIB thoughts or urges. Her ADL's are neglected and she needs assistance from staff in cleaning abdominal areas.(abdominal folds area noted to be red and irritated). Patient  has not been out of bed despite encouragement from staff to limit the amount of time spend in bed. She reports okay sleep and good appetite. She continues to endorse chronic back pain. Behaviors have been appropriate with no concerns.   "

## 2019-12-26 NOTE — PROGRESS NOTES
Pt began shift resting in bed, returned CPAP and left room to be present on milieu for majority of shift. Pt was withdrawn but was willing and able to socialize with select peers and all staff. Pt affect was blunted/flat and sad, but pt did engage in some joking and did not indicate any thoughts of violence or harm against self or others. Pt made phone call after visiting hours which resulted in tearful affect, but pt remained calm and appropriate. Pt agreed to check-in with staff but was unable to complete due to number of Codes which took place in the final hours of the shift. Pt had advocated for safety previously in the shift despite claims of depression. Pt not appearing to be hallucinating but appears confused during interactions with staff. Pt claims to have trouble remembering what they need assistance with when in front of staff.       12/25/19 2329   Behavioral Health   Hallucinations denies / not responding to hallucinations   Thinking poor concentration;distractable   Orientation person: oriented;place: oriented;date: oriented;time: oriented   Memory other (see comment);baseline memory  (forgettful with requests)   Insight other (see comment)  (ERIN)   Judgement   (ERIN)   Eye Contact at examiner   Affect blunted, flat;sad   Mood depressed;mood is calm   Physical Appearance/Attire appears stated age;attire appropriate to age and situation   Hygiene other (see comment)  (adequate)   Suicidality other (see comments)  (ERIN)   Change in Protective Factors? No   Enviromental Risk Factors Medical bed   Self Injury other (see comment)  (ERIN, none observed)   Elopement   (none)   Activity withdrawn   Speech clear;coherent   Medication Sensitivity no observed side effects;no stated side effects   Psychomotor / Gait balanced;steady   Daily Care   Activity up ad andrea;other (see comments)   Activities of Daily Living   Hygiene/Grooming independent   Oral Hygiene independent   Dress street clothes;independent   Laundry  unable to complete   Room Organization independent

## 2019-12-26 NOTE — PROGRESS NOTES
"[facility] [place]   Psychiatric Progress Note        Interim History:   Reason for Hospitalization: Esther Ruiz is a 54 year old female with history of Major Depressive Disorder, Anxiety, Hypothyroidism, Obesity, and Chronic Back Pain is admitted on a voluntary basis for worsening suicidal ideation with intent and plan.    Subjective: \"Still priya depressed, but feeling better here. My wife and I need to work some stuff out.\"     As per today's interview: The patient was mostly in her room today. Needing help with her ADL's. Talkative when asked about her interpersonal stressors, mentions that she plans to meet with her wife soon and \"talk things out.\" Her depressive moods are relatively better compared to before, denies any SI, intent or plan. She mentions she has a chiropractic appointment this Saturday, which she hopes to make.  I also informed her she has appointments next week as well.           Medications:       buPROPion  350 mg Oral QAM     fluticasone-vilanterol  1 puff Inhalation Daily     ketoconazole   Topical Daily     levothyroxine  125 mcg Oral QAM AC     levothyroxine  150 mcg Oral QAM AC     melatonin  3 mg Oral At Bedtime     mometasone  2 spray Both Nostrils Daily     montelukast  10 mg Oral At Bedtime     multivitamin w/minerals  1 tablet Oral BID     penicillin V  500 mg Oral BID     polyethylene glycol  17 g Oral Daily     pregabalin  175 mg Oral BID     rivaroxaban ANTICOAGULANT  20 mg Oral Daily     topiramate  25 mg Oral At Bedtime          Allergies:     Allergies   Allergen Reactions     Augmentin      Diarrhea      Azithromycin      It has lactose in and she has intolerance to lactose          Labs:     Recent Results (from the past 48 hour(s))   CBC with platelets differential    Collection Time: 12/25/19  7:31 AM   Result Value Ref Range    WBC 9.5 4.0 - 11.0 10e9/L    RBC Count 4.71 3.8 - 5.2 10e12/L    Hemoglobin 13.2 11.7 - 15.7 g/dL    Hematocrit " 42.4 35.0 - 47.0 %    MCV 90 78 - 100 fl    MCH 28.0 26.5 - 33.0 pg    MCHC 31.1 (L) 31.5 - 36.5 g/dL    RDW 16.2 (H) 10.0 - 15.0 %    Platelet Count 238 150 - 450 10e9/L    Diff Method Automated Method     % Neutrophils 69.7 %    % Lymphocytes 20.3 %    % Monocytes 8.0 %    % Eosinophils 1.4 %    % Basophils 0.2 %    % Immature Granulocytes 0.4 %    Nucleated RBCs 0 0 /100    Absolute Neutrophil 6.6 1.6 - 8.3 10e9/L    Absolute Lymphocytes 1.9 0.8 - 5.3 10e9/L    Absolute Monocytes 0.8 0.0 - 1.3 10e9/L    Absolute Eosinophils 0.1 0.0 - 0.7 10e9/L    Absolute Basophils 0.0 0.0 - 0.2 10e9/L    Abs Immature Granulocytes 0.0 0 - 0.4 10e9/L    Absolute Nucleated RBC 0.0    Comprehensive metabolic panel    Collection Time: 12/25/19  7:31 AM   Result Value Ref Range    Sodium 139 133 - 144 mmol/L    Potassium 3.7 3.4 - 5.3 mmol/L    Chloride 106 94 - 109 mmol/L    Carbon Dioxide 26 20 - 32 mmol/L    Anion Gap 7 3 - 14 mmol/L    Glucose 101 (H) 70 - 99 mg/dL    Urea Nitrogen 21 7 - 30 mg/dL    Creatinine 1.17 (H) 0.52 - 1.04 mg/dL    GFR Estimate 52 (L) >60 mL/min/[1.73_m2]    GFR Estimate If Black 60 (L) >60 mL/min/[1.73_m2]    Calcium 8.4 (L) 8.5 - 10.1 mg/dL    Bilirubin Total 0.8 0.2 - 1.3 mg/dL    Albumin 3.0 (L) 3.4 - 5.0 g/dL    Protein Total 6.5 (L) 6.8 - 8.8 g/dL    Alkaline Phosphatase 124 40 - 150 U/L    ALT 60 (H) 0 - 50 U/L    AST 18 0 - 45 U/L   TSH with free T4 reflex and/or T3 as indicated    Collection Time: 12/25/19  7:31 AM   Result Value Ref Range    TSH 5.78 (H) 0.40 - 4.00 mU/L   Lipid panel    Collection Time: 12/25/19  7:31 AM   Result Value Ref Range    Cholesterol 189 <200 mg/dL    Triglycerides 71 <150 mg/dL    HDL Cholesterol 78 >49 mg/dL    LDL Cholesterol Calculated 97 <100 mg/dL    Non HDL Cholesterol 111 <130 mg/dL   T4 free    Collection Time: 12/25/19  7:31 AM   Result Value Ref Range    T4 Free 1.11 0.76 - 1.46 ng/dL          Psychiatric Examination:   BP (!) 150/85 (BP Location: Right  "arm)   Pulse 101   Temp 97.9  F (36.6  C) (Oral)   Resp 16   Wt (!) 202.6 kg (446 lb 11.2 oz)   LMP 12/29/2009   SpO2 96%   BMI 64.09 kg/m    Weight is 446 lbs 11.2 oz  Body mass index is 64.09 kg/m .    Appearance: Morbidly obese female. Laying in a bariatric bed.   Attitude: Cooperative in talking, appropriate.   Eye Contact:  Improved  Mood:  \"Feeling down overall\"   Affect:  intensity is blunted, but shows some more relative range.   Speech:  clear, coherent  Language: fluent and intact in English  Psychomotor, Gait, Musculoskeletal:  no evidence of tardive dyskinesia, dystonia, or tics  Throught Process:  logical, linear and goal oriented  Associations:  no loose associations  Thought Content:  no evidence of suicidal ideation or homicidal ideation, no evidence of psychotic thought, no auditory hallucinations present and no visual hallucinations present  Insight:  fair  Judgement:  fair  Oriented to:  time, person, and place  Attention Span and Concentration:  fair  Recent and Remote Memory:  fair  Fund of Knowledge:  appropriate      Assessment & Plan       Principal Diagnosis:   Major Depressive Disorder, recurrent, severe  R/o Adjustment Disorder with depressive moods  Anxiety Disorder  Hypothyroidism  Obesity  Chronic Back Pain    Assessment:   (Initial Assessment):   Patient appears to have worsened depressive moods with recent suicidal ideation, intent and plan in the context of interpersonal psychostressors involving her and her wife and recent care home time. She also has more chronic psychostressors (chronic pain, mobility issues). The patient seems mostly compliant with meds, however later found out she seems to not have taken Topamax for he past month. I will restart her on Topamax and off Melatonin for situationally induced sleep issues. Other medications will remain the same.       Update 12/26: Continued depressive moods, however relative improvements in mood, will less intense depressive " thoughts compared to before. She has some compromised self care, which staff has helped her with. She does have some help at home from her partner. Of note, the patient is on immediate release formulation of Wellbutrin, due to the XL formulation being coated and requiring active GI fluids to break down which is compromised in this patient who's had gastric bypass.      Plan:  1.) Medication Plan:  - Topamax 25 mg HS  - Wellbutrin 350 mg Daily (immediate release formulation)   - Melatonin 3 mg HS  - Pregabalin 175 mg BID     2.) Psychosocial Plan:  - Patient expressed some interest in increased services at home, however indicated that she is unsure if her insurance will cover it.      Legal:  Voluntary      Disposition:  Discharge back home likely tomorrow            Safety Assessment:   Checks: Status 15  Precautions: Suicide  Fall Risk  Pt has not required locked seclusion or restraints in the past 24 hours to maintain safety, please refer to RN documentation for further details.       The risks, benefits, alternatives and side effects have been discussed and are understood by the patient and other caregivers.         Attestation:  Patient has been seen and evaluated by me,  Corky Maldonado MD

## 2019-12-26 NOTE — PLAN OF CARE
BEHAVIORAL TEAM DISCUSSION    Participants: Dr. Corky Maldonado: Ashley MOSLEY; Kim SPAINSW; Susi Overton OTR/L  Progress: Patient visible in milieu. Compliant with care.  Anticipated length of stay: Short stay  Continued Stay Criteria/Rationale: Needs further stabilization of her mood.  Medical/Physical: Allgeries to Axzihromycin and Augmentin  Precautions:   Behavioral Orders   Procedures    Code 1 - Restrict to Unit    Fall precautions    Routine Programming     As clinically indicated    Single Room    Status 15     Every 15 minutes.    Suicide precautions     Patients on Suicide Precautions should have a Combination Diet ordered that includes a Diet selection(s) AND a Behavioral Tray selection for Safe Tray - with utensils, or Safe Tray - NO utensils       Plan: Stabilize the patient with further assessment and medication management. Discharge home when stabilized. Some discussion earlier about patient perhaps wanting in-home services but she does not have insurance to cover it.  Rationale for change in precautions or plan:  No changes

## 2019-12-27 VITALS
HEART RATE: 86 BPM | DIASTOLIC BLOOD PRESSURE: 81 MMHG | OXYGEN SATURATION: 98 % | SYSTOLIC BLOOD PRESSURE: 134 MMHG | TEMPERATURE: 98.1 F | WEIGHT: 293 LBS | BODY MASS INDEX: 64.09 KG/M2 | RESPIRATION RATE: 16 BRPM

## 2019-12-27 PROCEDURE — 90853 GROUP PSYCHOTHERAPY: CPT

## 2019-12-27 PROCEDURE — 25000132 ZZH RX MED GY IP 250 OP 250 PS 637: Mod: GY | Performed by: PSYCHIATRY & NEUROLOGY

## 2019-12-27 PROCEDURE — 99231 SBSQ HOSP IP/OBS SF/LOW 25: CPT | Performed by: PSYCHIATRY & NEUROLOGY

## 2019-12-27 PROCEDURE — 12400001 ZZH R&B MH UMMC

## 2019-12-27 PROCEDURE — 25000132 ZZH RX MED GY IP 250 OP 250 PS 637: Mod: GY | Performed by: PHYSICIAN ASSISTANT

## 2019-12-27 PROCEDURE — 25000132 ZZH RX MED GY IP 250 OP 250 PS 637: Mod: GY | Performed by: CLINICAL NURSE SPECIALIST

## 2019-12-27 RX ORDER — FLUCONAZOLE 150 MG/1
150 TABLET ORAL ONCE
Status: COMPLETED | OUTPATIENT
Start: 2019-12-27 | End: 2019-12-27

## 2019-12-27 RX ADMIN — TOPIRAMATE 25 MG: 25 CAPSULE, COATED PELLETS ORAL at 20:05

## 2019-12-27 RX ADMIN — TIZANIDINE 4 MG: 4 TABLET ORAL at 23:18

## 2019-12-27 RX ADMIN — MULTIPLE VITAMINS W/ MINERALS TAB 1 TABLET: TAB at 08:45

## 2019-12-27 RX ADMIN — LEVOTHYROXINE SODIUM 150 MCG: 13 CAPSULE ORAL at 07:02

## 2019-12-27 RX ADMIN — HYDROCODONE BITARTRATE AND ACETAMINOPHEN 2 TABLET: 5; 325 TABLET ORAL at 08:46

## 2019-12-27 RX ADMIN — FLUCONAZOLE 150 MG: 150 TABLET ORAL at 10:41

## 2019-12-27 RX ADMIN — MOMETASONE 2 SPRAY: 50 SPRAY, METERED NASAL at 08:45

## 2019-12-27 RX ADMIN — LEVOTHYROXINE SODIUM 125 MCG: 125 CAPSULE ORAL at 07:02

## 2019-12-27 RX ADMIN — TIZANIDINE 4 MG: 4 TABLET ORAL at 00:36

## 2019-12-27 RX ADMIN — HYDROCODONE BITARTRATE AND ACETAMINOPHEN 1 TABLET: 5; 325 TABLET ORAL at 20:21

## 2019-12-27 RX ADMIN — BUPROPION HYDROCHLORIDE 350 MG: 75 TABLET, FILM COATED ORAL at 08:43

## 2019-12-27 RX ADMIN — MULTIPLE VITAMINS W/ MINERALS TAB 1 TABLET: TAB at 20:05

## 2019-12-27 RX ADMIN — PREGABALIN 175 MG: 100 CAPSULE ORAL at 20:05

## 2019-12-27 RX ADMIN — MELATONIN TAB 3 MG 3 MG: 3 TAB at 21:52

## 2019-12-27 RX ADMIN — POLYETHYLENE GLYCOL 3350 17 G: 17 POWDER, FOR SOLUTION ORAL at 08:45

## 2019-12-27 RX ADMIN — RIVAROXABAN 20 MG: 10 TABLET, FILM COATED ORAL at 08:46

## 2019-12-27 RX ADMIN — PREGABALIN 175 MG: 100 CAPSULE ORAL at 08:46

## 2019-12-27 RX ADMIN — MONTELUKAST 10 MG: 10 TABLET, FILM COATED ORAL at 20:05

## 2019-12-27 RX ADMIN — DICLOFENAC SODIUM 4 G: 10 GEL TOPICAL at 20:06

## 2019-12-27 RX ADMIN — PENICILLIN V POTASIUM 500 MG: 500 TABLET OROPHARYNGEAL at 08:45

## 2019-12-27 RX ADMIN — MICONAZOLE NITRATE: 20 CREAM TOPICAL at 22:00

## 2019-12-27 RX ADMIN — MELATONIN TAB 3 MG 3 MG: 3 TAB at 00:17

## 2019-12-27 RX ADMIN — KETOCONAZOLE: 20 SHAMPOO, SUSPENSION TOPICAL at 08:44

## 2019-12-27 RX ADMIN — FLUTICASONE FUROATE AND VILANTEROL TRIFENATATE 1 PUFF: 200; 25 POWDER RESPIRATORY (INHALATION) at 08:43

## 2019-12-27 RX ADMIN — PENICILLIN V POTASIUM 500 MG: 500 TABLET OROPHARYNGEAL at 20:05

## 2019-12-27 ASSESSMENT — ACTIVITIES OF DAILY LIVING (ADL)
HYGIENE/GROOMING: PROMPTS
LAUNDRY: WITH SUPERVISION
ORAL_HYGIENE: INDEPENDENT
HYGIENE/GROOMING: PROMPTS
DRESS: STREET CLOTHES;INDEPENDENT
LAUNDRY: UNABLE TO COMPLETE
DRESS: STREET CLOTHES
ORAL_HYGIENE: INDEPENDENT

## 2019-12-27 NOTE — PROGRESS NOTES
"\"I've turned a corner\" when asked about her anxiety and depression. Pt rated each a \"2\". Pt denies SI, SIB, or a wish to die. Pt slept good and her appetite is normal. Pt's affect is mid-range. Her mood is pleasant.     12/27/19 1001   Sleep/Rest/Relaxation   Sleep/Rest/Relaxation (WDL) WDL   Behavioral Health   Hallucinations denies / not responding to hallucinations   Thinking intact   Orientation person: oriented;place: oriented;date: oriented;time: oriented   Memory baseline memory   Insight insight appropriate to situation   Judgement intact   Eye Contact at examiner   Affect   (mid-range)   Mood mood is calm   Physical Appearance/Attire appears stated age;attire appropriate to age and situation;untidy   Hygiene neglected grooming - unclean body, hair, teeth   Suicidality   (denies)   1. Wish to be Dead (Recent) No   2. Non-Specific Active Suicidal Thoughts (Recent) No   Self Injury   (denies)   Elopement (WDL) WDL   Activity withdrawn;isolative   Speech coherent;clear   Medication Sensitivity no stated side effects;no observed side effects   Psychomotor / Gait slow;balanced;steady   Overt Agression (WDL) WDL   Coping/Psychosocial   Verbalized Emotional State hopefulness;acceptance   Safety   Suicidality Status 15;Identify and strengthen protective factors;Promote patient engagement with treatment process;Optimize communication / relationship to minimize opportunity for self-harm   Elopement status 15   Activities of Daily Living   Hygiene/Grooming prompts   Oral Hygiene independent   Dress street clothes   Laundry with supervision   Room Organization   (difficult for pt due to morbid obesity)     "

## 2019-12-27 NOTE — DISCHARGE INSTRUCTIONS
Behavioral Discharge Planning and Instructions      Summary:  You were admitted on 12/24/2019  due to Depression, Anxiety and Suicidal Ideations.  You were treated by Dr Corky Maldonado MD and discharged on 12/28/19 from [facility] to your home in [place]     Principal Diagnosis:   Anxiety disorder  Major Depressive Disorder, Recurrent    Health Care Follow-up Appointments:   Dr. Melanie Scherer - Next Appt on Friday, January 3rd at 1:40pm  Dr. Jenaro Rojas (previously scheduled follow-up) on Monday, December 30th at 11am  Hearing Consult w/ Ar[facility]ia (previously scheduled) on Monday, December 30th at 1pm  [address]  [address]  [phone]  FAX: [phone]    Attend all scheduled appointments with your outpatient providers. Call at least 24 hours in advance if you need to reschedule an appointment to ensure continued access to your outpatient providers.   Major Treatments, Procedures and Findings:  You were provided with: a psychiatric assessment, assessed for medical stability, medication evaluation and/or management, group therapy and milieu management    Symptoms to Report: feeling more aggressive, increased confusion, losing more sleep, mood getting worse or thoughts of suicide    Early warning signs can include: increased depression or anxiety sleep disturbances increased thoughts or behaviors of suicide or self-harm  increased unusual thinking, such as paranoia or hearing voices    Safety and Wellness:  Take all medicines as directed.  Make no changes unless your doctor suggests them.      Follow treatment recommendations.  Refrain from alcohol and non-prescribed drugs.  Ask your support system to help you reduce your access to items that could harm yourself or others. If there is a concern for safety, call 911.    Resources:   National [place] on Mental Illness (www.mn.nick.org): 734.402.3400 or 328-936-6288.  [facility]  992.421.5848    Lifestyle Adjustment:   1. Adjust your lifestyle to get enough sleep, relaxation, exercise and good nutrition.  Continue to develop healthy coping skills to decrease stress and promote a healthy  lifestyle.  2. Abstain from all substances of abuse.  3. Take medications as prescribed.  Please work with your doctor to discuss any concerns you have with your medications or side effects you may be experiencing.  4. Follow up with appointments as scheduled.      General Medication Instructions:   1. See your medication sheet(s) for instructions.   2. Take all medicines as directed.  Make no changes unless your doctor suggests them.   3. Go to all your doctor visits.  4. Be sure to have all your required lab tests. This way, your medicines can be refilled on time.  5. Do not use any drugs not prescribed by your doctor.    The treatment team has appreciated the opportunity to work with you.     If you have any questions or concerns our unit number is [phone]

## 2019-12-27 NOTE — PROGRESS NOTES
Patient's wife called (Stefani) [phone] who said she can  the patient on Saturday morning at 10AM.

## 2019-12-27 NOTE — PROGRESS NOTES
"   12/26/19 2211   Behavioral Health   Hallucinations denies / not responding to hallucinations   Thinking poor concentration   Orientation time: oriented;date: oriented;place: oriented;person: oriented   Memory baseline memory   Insight admits / accepts   Judgement intact   Eye Contact at examiner   Affect full range affect;blunted, flat   Mood depressed;mood is calm   Physical Appearance/Attire attire appropriate to age and situation;appears stated age   Hygiene neglected grooming - unclean body, hair, teeth   Suicidality   (pt denies)   1. Wish to be Dead (Recent) No   2. Non-Specific Active Suicidal Thoughts (Recent) No   3. Active Sucidal Ideation with any Methods (Not Plan) Without Intent to Act (Recent) No   4. Active Suicidal Ideation with Some Intent to Act, Without Specific Plan (Recent) No   5. Active Suicidal Ideation with Specific Plan and Intent (Recent) No   Enviromental Risk Factors Medical bed  (cpap, walker, and wheelchair)   Self Injury   (pt denies)   Elopement   (shows no signs)   Activity   (social in the milieu)   Speech coherent;clear   Medication Sensitivity no observed side effects;no stated side effects   Psychomotor / Gait steady;balanced   Activities of Daily Living   Hygiene/Grooming prompts;with assistance   Oral Hygiene independent   Dress street clothes   Laundry with supervision   Room Organization unable     Pt was withdrawn for the majority of the shift, however, at times she was social with her peers and staff.  She was on the phone for more then half the shift. Pt's affect was full range but calm and her concentration is fine, appetite is good, sleep is good, pain is back and knee chronic pain, and no SE's.  She reported having depression at a 2 but it was higher earlier in the day and anxiety as \"not too much but it was earlier in the day.\"  Pt reported no SI/SIB/HI/VH/or AH and patient reported that she hadn't taken a shower since she was admitted but she did wipe herself with " the staff's help.

## 2019-12-28 LAB — PAIN DRUG SCR UR W RPTD MEDS: NORMAL

## 2019-12-28 PROCEDURE — 40000275 ZZH STATISTIC RCP TIME EA 10 MIN

## 2019-12-28 PROCEDURE — 25000132 ZZH RX MED GY IP 250 OP 250 PS 637: Mod: GY | Performed by: PSYCHIATRY & NEUROLOGY

## 2019-12-28 PROCEDURE — 25000132 ZZH RX MED GY IP 250 OP 250 PS 637: Mod: GY | Performed by: CLINICAL NURSE SPECIALIST

## 2019-12-28 PROCEDURE — 94660 CPAP INITIATION&MGMT: CPT

## 2019-12-28 PROCEDURE — 25000132 ZZH RX MED GY IP 250 OP 250 PS 637: Mod: GY | Performed by: PHYSICIAN ASSISTANT

## 2019-12-28 RX ADMIN — PENICILLIN V POTASIUM 500 MG: 500 TABLET OROPHARYNGEAL at 09:41

## 2019-12-28 RX ADMIN — MULTIPLE VITAMINS W/ MINERALS TAB 1 TABLET: TAB at 09:41

## 2019-12-28 RX ADMIN — HYDROCODONE BITARTRATE AND ACETAMINOPHEN 2 TABLET: 5; 325 TABLET ORAL at 02:35

## 2019-12-28 RX ADMIN — BUPROPION HYDROCHLORIDE 350 MG: 75 TABLET, FILM COATED ORAL at 09:40

## 2019-12-28 RX ADMIN — FLUTICASONE FUROATE AND VILANTEROL TRIFENATATE 1 PUFF: 200; 25 POWDER RESPIRATORY (INHALATION) at 09:39

## 2019-12-28 RX ADMIN — RIVAROXABAN 20 MG: 10 TABLET, FILM COATED ORAL at 09:41

## 2019-12-28 RX ADMIN — LEVOTHYROXINE SODIUM 150 MCG: 13 CAPSULE ORAL at 08:25

## 2019-12-28 RX ADMIN — MOMETASONE 2 SPRAY: 50 SPRAY, METERED NASAL at 09:40

## 2019-12-28 RX ADMIN — PREGABALIN 175 MG: 100 CAPSULE ORAL at 09:41

## 2019-12-28 RX ADMIN — HYDROCODONE BITARTRATE AND ACETAMINOPHEN 2 TABLET: 5; 325 TABLET ORAL at 09:20

## 2019-12-28 RX ADMIN — DICLOFENAC SODIUM 4 G: 10 GEL TOPICAL at 09:21

## 2019-12-28 RX ADMIN — LEVOTHYROXINE SODIUM 125 MCG: 125 CAPSULE ORAL at 08:21

## 2019-12-28 NOTE — PROGRESS NOTES
"   12/27/19 1600   Group Therapy Session   Group Attendance attended group session   Total Time (minutes) 45   Group Type psychotherapeutic   Group Topic Covered emotions/expression   Patient Participation/Contribution cooperative with task;discussed personal experience with topic;listened actively   Patient participated in psychotherapy group which focused on feelings expression in-the-moment through drawing and then processing.  Margret reported feeling \"blah\" earlier today but during group she described feeling \"manic, like her thoughts are racing. She engaged appropriately with group. She participated in the activities and processed with the others. She reports looking forward to discharge tomorrow.  "

## 2019-12-28 NOTE — PLAN OF CARE
Pt was escorted off the unit to wife (Stefani).  Pt was angry when first talking to writer but calmed after writer told her the cord for CPAP was in her locker and that staff may not have seen it because it was in the second locker. Pt calmed and was joking with writer after her concerns/problems were answered. Pt verbalized readiness for discharge. Pt verbalized understanding of discharge plan including discharge medications and follow up appointments. Pt was given pain gel and norco for severe pain with some relief. Pt left with all personal belongings.  A large cart was used to bring out all of her personal items. Pt denied SI and SIB.  Pt was eager to discharge and was going to the chiropractor today.

## 2019-12-28 NOTE — PROGRESS NOTES
"[facility] [place]   Psychiatric Progress Note        Interim History:   Reason for Hospitalization: Esther Ruiz is a 54 year old female with history of Major Depressive Disorder, Anxiety, Hypothyroidism, Obesity, and Chronic Back Pain is admitted on a voluntary basis for worsening suicidal ideation with intent and plan.    Subjective: \"Still priya depressed, but feeling better here. My wife and I need to work some stuff out.\"     As per today's interview: The patient was mostly in her room today. Needing help with her ADL's. Talkative when asked about her interpersonal stressors, mentions that she plans to meet with her wife soon and \"talk things out.\" Her depressive moods are relatively better compared to before, denies any SI, intent or plan. She mentions she has a chiropractic appointment this Saturday, which she hopes to make.  I also informed her she has appointments next week as well.            Medications:       buPROPion  350 mg Oral QAM     fluticasone-vilanterol  1 puff Inhalation Daily     ketoconazole   Topical Daily     levothyroxine  125 mcg Oral QAM AC     levothyroxine  150 mcg Oral QAM AC     melatonin  3 mg Oral At Bedtime     mometasone  2 spray Both Nostrils Daily     montelukast  10 mg Oral At Bedtime     multivitamin w/minerals  1 tablet Oral BID     penicillin V  500 mg Oral BID     polyethylene glycol  17 g Oral Daily     pregabalin  175 mg Oral BID     rivaroxaban ANTICOAGULANT  20 mg Oral Daily     topiramate  25 mg Oral At Bedtime          Allergies:     Allergies   Allergen Reactions     Augmentin      Diarrhea      Azithromycin      It has lactose in and she has intolerance to lactose          Labs:     No results found for this or any previous visit (from the past 48 hour(s)).       Psychiatric Examination:   /69   Pulse 94   Temp 97.6  F (36.4  C) (Oral)   Resp 16   Wt (!) 202.6 kg (446 lb 11.2 oz)   LMP 12/29/2009   SpO2 96%   BMI 64.09 " "kg/m    Weight is 446 lbs 11.2 oz  Body mass index is 64.09 kg/m .    Appearance: Morbidly obese female. Laying in a bariatric bed.   Attitude: Cooperative in talking, appropriate.   Eye Contact:  Improved  Mood:  \"Feeling ok overall\"   Affect:  intensity is blunted, but shows some more relative range.   Speech:  clear, coherent  Language: fluent and intact in English  Psychomotor, Gait, Musculoskeletal:  no evidence of tardive dyskinesia, dystonia, or tics  Throught Process:  logical, linear and goal oriented  Associations:  no loose associations  Thought Content:  no evidence of suicidal ideation or homicidal ideation, no evidence of psychotic thought, no auditory hallucinations present and no visual hallucinations present  Insight:  fair  Judgement:  fair  Oriented to:  time, person, and place  Attention Span and Concentration:  fair  Recent and Remote Memory:  fair  Fund of Knowledge:  appropriate      Assessment & Plan       Principal Diagnosis:   Major Depressive Disorder, recurrent, severe  R/o Adjustment Disorder with depressive moods  Anxiety Disorder  Hypothyroidism  Obesity  Chronic Back Pain    Assessment:   (Initial Assessment):   Patient appears to have worsened depressive moods with recent suicidal ideation, intent and plan in the context of interpersonal psychostressors involving her and her wife and recent half-way time. She also has more chronic psychostressors (chronic pain, mobility issues). The patient seems mostly compliant with meds, however later found out she seems to not have taken Topamax for he past month. I will restart her on Topamax and off Melatonin for situationally induced sleep issues. Other medications will remain the same.       Update 12/26: Continued depressive moods, however relative improvements in mood, will less intense depressive thoughts compared to before. She has some compromised self care, which staff has helped her with. She does have some help at home from her partner. " Of note, the patient is on immediate release formulation of Wellbutrin, due to the XL formulation being coated and requiring active GI fluids to break down which is compromised in this patient who's had gastric bypass.     Update 12/27: Subjective improvement in depressive moods, denies suicidal thoughts, is future oriented and is ok with going back home. Her partner will pick her up tomorrow.      Plan:  1.) Medication Plan:  - Topamax 25 mg HS  - Wellbutrin 350 mg Daily (immediate release formulation)   - Melatonin 3 mg HS  - Pregabalin 175 mg BID     2.) Psychosocial Plan:  - Patient expressed some interest in increased services at home, however indicated that she is unsure if her insurance will cover it.      Legal:  Voluntary      Disposition:  Discharge back home likely tomorrow            Safety Assessment:   Checks: Status 15  Precautions: Suicide  Fall Risk  Pt has not required locked seclusion or restraints in the past 24 hours to maintain safety, please refer to RN documentation for further details.       The risks, benefits, alternatives and side effects have been discussed and are understood by the patient and other caregivers.         Attestation:  Patient has been seen and evaluated by me,  Corky Maldonado MD

## 2019-12-28 NOTE — PROGRESS NOTES
Patient is irritable that her CPAP machine cord is unable to be found and so her home CPAP machine cannot be used at the moment. Day RN will be asked where the cord is when they arrive. Writer did have respiratory bring the patient another machine, patient tried using it however states it's too loud and is wanting us to call the staff that removed the CPAP cord, staff will be asked in morning. Patient reported having a headache from the loud CPAP, she was provided Norco per request. Patient is now asleep.

## 2020-01-19 NOTE — DISCHARGE SUMMARY
[facility], [place]   Psychiatric Discharge Summary      Esther Ruiz MRN# [id number]   Age: 54 year old YOB: 1965     Date of Admission:  12/24/2019  Date of Discharge:  12/28/2019   Admitting Physician:  Corky Maldonado MD  Discharge Physician:  Corky Maldonado MD         Summary/Hospital Course/Disposition:   Reason for Hospitalization: Esther Ruiz is a 54 year old female with history of Major Depressive Disorder, Anxiety, Hypothyroidism, Obesity, and Chronic Back Pain is admitted on a voluntary basis for worsening suicidal ideation with intent and plan.      Hospital Course:   (Initial Assessment):   Patient appears to have worsened depressive moods with recent suicidal ideation, intent and plan in the context of interpersonal psychostressors involving her and her wife and recent [facility] time. She also has more chronic psychostressors (chronic pain, mobility issues). The patient seems mostly compliant with meds, however later found out she seems to not have taken Topamax for he past month. I will restart her on Topamax and off Melatonin for situationally induced sleep issues. Other medications will remain the same.         Update 12/26: Continued depressive moods, however relative improvements in mood, will less intense depressive thoughts compared to before. She has some compromised self care, which staff has helped her with. She does have some help at home from her partner. Of note, the patient is on immediate release formulation of Wellbutrin, due to the XL formulation being coated and requiring active GI fluids to break down which is compromised in this patient who's had gastric bypass.      Update 12/27-12/28: Subjective improvement in depressive moods, denies suicidal thoughts, is future oriented and is ok with going back home. Her partner will pick her up tomorrow.            DIagnoses:   Major Depressive Disorder, recurrent, severe  R/o Adjustment  Disorder with depressive moods  Anxiety Disorder  Hypothyroidism  Obesity  Chronic Back Pain         Labs:   No results found for this or any previous visit (from the past 24 hour(s)).         Consults:      Assessment and Plan/Recommendations:   Esther Ruiz is a 54 year old female with history of hypothyroidism, recurrent BLE infections, DVT, EDILIA, chronic pain, GERD, HTN, depression, CKD III, and anxiety who was admitted to station 10 with psychiatric decompensation      Depression and anxiety with SI: 2/2 recently being in intermediate  - Managemetn per psych      CKD III: BL Cr 01-1.2. No PTA ACE-I intentional per charting. Cr is stable. Avoid nephrotoxic meds      HTN: No PTA meds. /83 on last check 12/24. Likely 2/2 psychiatric decompensation   - Repeat BP now   - Hydralazine prn for SBP>170 or DBP>110  ** Addendum: BP improved to 147/85 without intervention. Contact medicine if BP spikes >170/>110 or is consistently >150s/>90s     Chronic, recurrent BLE infections: Issues with BLE infections for several years. Was last admitted to the hospital 7/2019 for infections. Follows OP with ID through [facility] which unfortunately does not have Epic, so I am unable to access records. Reports she was started on long term (6-12 months) PCN to prevent further infections 11/2019. No e/o active skin infection on exam. Afebrile  - Continue PTA PCN  - Follow up with OP ID as indicated  - Contact medicine with changes or concerns      H/o DVT: Per patient, etiology unclear but she was diagnosed with several clots in the same extremity several year ago. No dyspnea, chest pain, palpitations or change in BLE swelling. Continue PTA Rivaroxaban      Chronic pain: Continue PTA lyrica. Will defer Norco and Tizanidine use to primary team      GERD: No PTA meds, monitor      EDILIA: Continue CPAP     Transaminitis: Mild. ALT 60, other LFTs normal.  - Trend with PCP on discharge      Hypothyroidism: TSH 5.78 but T4 1.11. Continue  PTA synthroid      Asthma: Stable. Continue PTA albuterol, SIngulair, and Advair         Currently, medically stable and internal medicine will sign off. Please contact if future questions or concerns arise. Thank you for the opportunity to be a part of this patient's care.        Isamar Crowe PA-C  Internal Medicine [facility] Hospitalist  [phone]  December 25, 2019             Discharge Medications:        Review of your medicines      CONTINUE these medicines which have NOT CHANGED      Dose / Directions   albuterol 108 (90 Base) MCG/ACT inhaler  Commonly known as:  PROAIR HFA/PROVENTIL HFA/VENTOLIN HFA      Dose:  1-2 puff  Inhale 1-2 puffs into the lungs every 6 hours as needed for shortness of breath / dyspnea or wheezing  Refills:  0     * ASPIRIN NOT PRESCRIBED  Commonly known as:  INTENTIONAL      by Other route continuous prn.  Refills:  0     * ACE/ARB/ARNI NOT PRESCRIBED  Commonly known as:  INTENTIONAL      by Other route continuous prn.  Refills:  0     buPROPion 100 MG tablet  Commonly known as:  WELLBUTRIN      Dose:  350 mg  Take 350 mg by mouth every morning  Refills:  0     diclofenac 1 % topical gel  Commonly known as:  VOLTAREN      Dose:  4 g  Place 4 g onto the skin 2 times daily as needed (Knee pain)  Refills:  0     fluticasone-salmeterol 500-50 MCG/DOSE inhaler  Commonly known as:  ADVAIR      Dose:  1 puff  Inhale 1 puff into the lungs every 12 hours  Refills:  0     HYDROcodone-acetaminophen 5-325 MG tablet  Commonly known as:  NORCO  Used for:  S/P right knee arthroscopy      Dose:  1-2 tablet  Take 1-2 tablets by mouth every 4 hours as needed for other (Moderate to Severe Pain)  Quantity:  40 tablet  Refills:  0     ketoconazole 2 % external shampoo  Commonly known as:  NIZORAL  Used for:  HYPOTHYROIDISM NOS      Apply  topically. topically once daily prn  Quantity:  120 mL  Refills:  3     mometasone 50 MCG/ACT nasal spray  Commonly known as:  NASONEX      Dose:  2 spray  Spray  "2 sprays into both nostrils daily  Refills:  0     montelukast 10 MG tablet  Commonly known as:  SINGULAIR      Dose:  10 mg  Take 10 mg by mouth At Bedtime  Refills:  0     multivitamin w/minerals tablet      Dose:  1 tablet  Take 1 tablet by mouth 2 times daily  Refills:  0     penicillin V 500 MG tablet  Commonly known as:  VEETID      Dose:  500 mg  Take 500 mg by mouth 2 times daily  Refills:  0     * pregabalin 100 MG capsule  Commonly known as:  LYRICA      Dose:  100 mg  Take 100 mg by mouth 2 times daily Takes with 75 mg capsules  Refills:  0     * pregabalin 75 MG capsule  Commonly known as:  LYRICA      Dose:  75 mg  Take 75 mg by mouth 2 times daily Takes with 100 mg capsules  Refills:  0     rivaroxaban ANTICOAGULANT 20 MG Tabs tablet  Commonly known as:  XARELTO      Dose:  20 mg  Take 20 mg by mouth daily  Refills:  0     * Tirosint 125 MCG capsule  Generic drug:  levothyroxine      Dose:  125 mcg  Take 125 mcg by mouth every morning (before breakfast) Takes with 150 mcg capsule  Total dose = 275 mcg daily  Refills:  0     * Tirosint 150 MCG capsule  Generic drug:  levothyroxine      Dose:  150 mcg  Take 150 mcg by mouth every morning (before breakfast) Takes with 125 mcg capsules  Total dose = 275 mcg daily  Refills:  0     tiZANidine 4 MG tablet  Commonly known as:  ZANAFLEX      Dose:  4 mg  Take 4 mg by mouth 3 times daily as needed for muscle spasms  Refills:  0     topiramate 25 MG capsule  Commonly known as:  TOPAMAX      Dose:  50 mg  Take 50 mg by mouth At Bedtime  Refills:  0         * This list has 6 medication(s) that are the same as other medications prescribed for you. Read the directions carefully, and ask your doctor or other care provider to review them with you.                     Mental Status Examination:   Appearance: Morbidly obese female. Laying in a bariatric bed.   Attitude: Cooperative in talking, appropriate.   Eye Contact:  Improved  Mood:  \"Feeling ok " "overall\"   Affect:  intensity is blunted, but shows some more relative range.   Speech:  clear, coherent  Language: fluent and intact in English  Psychomotor, Gait, Musculoskeletal:  no evidence of tardive dyskinesia, dystonia, or tics  Throught Process:  logical, linear and goal oriented  Associations:  no loose associations  Thought Content:  no evidence of suicidal ideation or homicidal ideation, no evidence of psychotic thought, no auditory hallucinations present and no visual hallucinations present  Insight:  fair  Judgement:  fair  Oriented to:  time, person, and place  Attention Span and Concentration:  fair  Recent and Remote Memory:  fair  Fund of Knowledge:  appropriate         Discharge Plan:        Follow Up ([facility])    Follow up with primary care provider, Melanie Scherer, within 7 days for hospital follow- up.  The following labs/tests are recommended: CMP    Follow up with ID as indicated.      Appointments on [place] and/or [facility] (with [facility] or [facility] provider or service). Call [phone] if you haven't heard regarding these appointments within 7 days of discharge.     Health Care Follow-up Appointments:   Dr. Melanie Scherer - Next Appt on Friday, January 3rd at 1:40pm  Dr. Jenaro Rojas (previously scheduled follow-up) on Monday, December 30th at 11am  Hearing Consult w/ [facility] (previously scheduled) on Monday, December 30th at 1pm  [address]  [id number]  [phone]  FAX: [phone]      Corky Maldonado MD   [facility]    "

## 2020-01-22 ENCOUNTER — NURSE TRIAGE (OUTPATIENT)
Dept: NURSING | Facility: CLINIC | Age: 55
End: 2020-01-22

## 2020-01-22 NOTE — TELEPHONE ENCOUNTER
Reason for call; [facility] Nurse Roxie from [place] assisted to [facility] pharmacy for clarification on in Pt Rx , Brand and also given medical records phone number .        Caller Verbalizes understanding and denies further questions  questions  .  Vanesa Perez RN  - [place] Nurse Advisor

## 2020-02-10 ENCOUNTER — HEALTH MAINTENANCE LETTER (OUTPATIENT)
Age: 55
End: 2020-02-10

## 2020-11-14 ENCOUNTER — HEALTH MAINTENANCE LETTER (OUTPATIENT)
Age: 55
End: 2020-11-14

## 2021-03-28 ENCOUNTER — HEALTH MAINTENANCE LETTER (OUTPATIENT)
Age: 56
End: 2021-03-28

## 2021-09-12 ENCOUNTER — HEALTH MAINTENANCE LETTER (OUTPATIENT)
Age: 56
End: 2021-09-12

## 2022-01-02 ENCOUNTER — HEALTH MAINTENANCE LETTER (OUTPATIENT)
Age: 57
End: 2022-01-02

## 2022-04-24 ENCOUNTER — HEALTH MAINTENANCE LETTER (OUTPATIENT)
Age: 57
End: 2022-04-24

## 2022-11-19 ENCOUNTER — HEALTH MAINTENANCE LETTER (OUTPATIENT)
Age: 57
End: 2022-11-19

## 2023-04-09 ENCOUNTER — HEALTH MAINTENANCE LETTER (OUTPATIENT)
Age: 58
End: 2023-04-09

## 2023-06-01 ENCOUNTER — HEALTH MAINTENANCE LETTER (OUTPATIENT)
Age: 58
End: 2023-06-01

## 2023-09-05 ENCOUNTER — TRANSFERRED RECORDS (OUTPATIENT)
Dept: HEALTH INFORMATION MANAGEMENT | Facility: CLINIC | Age: 58
End: 2023-09-05
Payer: COMMERCIAL
